# Patient Record
(demographics unavailable — no encounter records)

---

## 2024-11-15 NOTE — PHYSICAL EXAM
[Chaperone Present] : A chaperone was present in the examining room during all aspects of the physical examination [[id number]] : A chaperone was present during the pelvic exam. [Appropriately responsive] : appropriately responsive [Alert] : alert [No Acute Distress] : no acute distress [Soft] : soft [Non-distended] : non-distended [No HSM] : No HSM [No Lesions] : no lesions [No Mass] : no mass [Oriented x3] : oriented x3 [Labia Majora] : normal [Labia Minora] : normal [Discharge] : a  ~M vaginal discharge was present [Normal] : normal [Uterine Adnexae] : normal [FreeTextEntry2] : [facility] [FreeTextEntry7] : c/s scar healed well; pt expressed pain on deep palpation of abdomen. mild rebound no guarding.   [Motion Tenderness] : motion tenderness [Tenderness] : tender

## 2024-11-15 NOTE — HISTORY OF PRESENT ILLNESS
[FreeTextEntry1] : 2024. AMERICA AGUIAR 31 year old  female presents for f/u PID s/p ED visit. Was first seen in office by me  with c-o vag d-c and abd pain, had c-s 4 mos ago. no fever or other associated sxs. pain worsened  and sent pt to ER. Pt went to ED on 24, dx'd w/ PID, started on abx - given Ceftriaxone IM and started on Doxy and Flagyl x2 wks CT of A/P done at ED showed - slight hyperemia of uterus, fluid in endocervical canal, appendix is not obstructed, correlate with infection. CT also showed indeterminate 1.4cm R breast mass, recommend mammo/sono- I informed pt and she will have this done   Labs drawn at ED: HCG - negative Liver and kidney functions - wnl White count - normal CBC - normal  BD affirm 24 - positive for BV (currently on Flagyl). Pt reports yellow vaginal discharge still present.  Pt reports abdominal pain/bloating is still present. Has been taking Tylenol/Motrin w/o much improvement. Last night, she noted mid to lower abdominal pain was the worst it has been. Denies back pain, fever, or chills. She has been nauseous, likely 2/2 abx use. Reports normal urination. Normal BMs. She is sexually active in a monogamous relationship.  PMHx: denies GYNHx: denies SHx: wisdom teeth, c/s on 24 @ 40w1d - 7lb 10oz, Gunichol (boy). All: NKDA

## 2024-11-15 NOTE — PLAN
[FreeTextEntry1] : 31 year old female presents with ? PID diagnosis, no appendicitis, no uterine mass, no bowel obstruction, unresponsive to oral abx Severe CMT on pelvic exam today Sending pt to ED for IV antibiotics and expedited consult with ID  CT also showed indeterminate 1.4cm R breast mass: Recommend diagnostic mammo/sono, Rx given.  40 min spent with pt and in discussion with  on speaker phone

## 2024-11-19 NOTE — HISTORY OF PRESENT ILLNESS
[FreeTextEntry1] : AMERICA AGUIAR 31 year old  female presents recently followed for presumed PID treated with ABx then seen in office 1 week ago with worsening sx and transferred to ED due to severe CMT - Pt was seen by ID and abx discontinued.  Pt s/p MRI showing stable L paraovarian cyst with possibility of ruptured ovarian cyst. Pain was improved during hospitalization with Motrin and tylenol.  Pt reports pain worsened when she got home "because I was more on my feet"  Also notes vaginal spotting while on OCPs "I'm not due for my period for another week" reports h/o severe abdominal cramping and heavy bleeding that was improved on OCPs  Denies vaginal discharge or urinary symptoms

## 2024-11-19 NOTE — PLAN
[FreeTextEntry1] : 30 yo P1 postpartum 4 mos with pelvic pain ?endometriosis vs alternate non-GYN pathology vs  (interstitial cystitis?) - irregular bleeding and pain may be due to decreased effectiveness of OCPs from recent abx - continue OCPs -  Motrin 800mg TID - f/u CBC, CMP and UCx  f/u in 2 weeks or prn if worsens

## 2024-11-19 NOTE — PHYSICAL EXAM
[FreeTextEntry7] : soft nondistended +diffuse tenderness worse in suprapubic region. no rebound/guarding [Labia Majora] : normal [Labia Minora] : normal [Normal] : normal [Uterine Adnexae] : normal [FreeTextEntry5] : no CMT. +pain with palpation of anterior vaginal vault

## 2024-12-03 NOTE — PLAN
[FreeTextEntry1] : #BTB on Laura -likely 2/2 irregular OCP use and recent stress (inpatient hospitalization, illness) recommend taking OCP q24h reassess in 3 mo. if BTB does not improve, plan for repeat tvus  #increased exhaustion pt to f/u with PCP to r/o other cuases bleeding light, likely not related to anemia. CBC last week WNL  #BIRAD3 f/u in 6 month with b/l juan/sono  RTO for annual GYN exam MALIA Rose MD

## 2024-12-03 NOTE — HISTORY OF PRESENT ILLNESS
[FreeTextEntry1] : 31 year old  female presents recently followed for presumed PID treated with ABx then seen in office 1 week ago with worsening sx and transferred to ED due to severe CMT - Pt was seen by ID and abx discontinued. Aabdominal pain likely 2/2 MSK pain vs. ruptured ovarian cyst. low suspicion for PID  Pt s/p MRI showing stable L paraovarian cyst with possibility of ruptured ovarian cyst. Pain was improved during hospitalization with Motrin and tylenol.   Pt reports that she has been having BTB for the past 2 weeks. Pt is on Laura for birth control. She states that she takes it nightly at 9pm but can take it within 30 min to 1 hour after it's due  Pain improved but she reports that she feels tired/worn down. Denies heavy VB

## 2024-12-05 NOTE — HISTORY OF PRESENT ILLNESS
[de-identified] : Ms. AMERICA AGUIAR is a 31 year old female who present today for initial consultation. Referred by Dr. Constantino Kay and Dr. Maribel Escamilla. She had a CT of the abdomen for abdominal pain and an incident right breast mass was seen. she subsequently had a mammogram and sonogram which showed alot of benign nodularity but no suspicious masses or calcifications. she has some right breast discomfort but no true masses on self exam. no family hx. of breast cancer  CT abd/pelvis 11/13/24: Nonspecific slight hyperemia of the uterus and fluid within the endocervical canal. There is an indeterminate 1.4 cm breast dense mass. Recommended mammo/sono   B/L mammo/sono 11/27/24: Dense breasts. Multiple bilateral mammographic nodules are seen. Nodules in the posterior central, and central outer aspect middle depth in the right breast likely correspond to a probably benign findings by sonography at 7:00 and 9:00 respectively on sonography, and a nodule in the upper posterior left breast likely corresponds to a probably benign finding by sonography at the 12:00 aspect. Follow-up bilateral diagnostic mammography and targeted bilateral sonography in 6 months are recommended to confirm stability of these findings. The finding at 7:00 on the right likely corresponds to the CT finding. See above discussion. Other benign findings are seen elsewhere bilaterally by mammography and sonography, as described above. BIRADS 3

## 2024-12-05 NOTE — HISTORY OF PRESENT ILLNESS
[de-identified] : Ms. AMERICA AGUIAR is a 31 year old female who present today for initial consultation. Referred by Dr. Constantino Kay and Dr. Maribel Escamilla. She had a CT of the abdomen for abdominal pain and an incident right breast mass was seen. she subsequently had a mammogram and sonogram which showed alot of benign nodularity but no suspicious masses or calcifications. she has some right breast discomfort but no true masses on self exam. no family hx. of breast cancer  CT abd/pelvis 11/13/24: Nonspecific slight hyperemia of the uterus and fluid within the endocervical canal. There is an indeterminate 1.4 cm breast dense mass. Recommended mammo/sono   B/L mammo/sono 11/27/24: Dense breasts. Multiple bilateral mammographic nodules are seen. Nodules in the posterior central, and central outer aspect middle depth in the right breast likely correspond to a probably benign findings by sonography at 7:00 and 9:00 respectively on sonography, and a nodule in the upper posterior left breast likely corresponds to a probably benign finding by sonography at the 12:00 aspect. Follow-up bilateral diagnostic mammography and targeted bilateral sonography in 6 months are recommended to confirm stability of these findings. The finding at 7:00 on the right likely corresponds to the CT finding. See above discussion. Other benign findings are seen elsewhere bilaterally by mammography and sonography, as described above. BIRADS 3

## 2024-12-05 NOTE — PHYSICAL EXAM
[Normal] : supple, no neck mass and thyroid not enlarged [Normal Supraclavicular Lymph Nodes] : normal supraclavicular lymph nodes [Normal Axillary Lymph Nodes] : normal axillary lymph nodes [Normal] : oriented to person, place and time, with appropriate affect [de-identified] : Fibrocytic changes with some discomfort in the lower outer aspect of the right breast but no distinct masses

## 2024-12-05 NOTE — PHYSICAL EXAM
[Normal] : supple, no neck mass and thyroid not enlarged [Normal Supraclavicular Lymph Nodes] : normal supraclavicular lymph nodes [Normal Axillary Lymph Nodes] : normal axillary lymph nodes [Normal] : oriented to person, place and time, with appropriate affect [de-identified] : Fibrocytic changes with some discomfort in the lower outer aspect of the right breast but no distinct masses

## 2024-12-05 NOTE — ASSESSMENT
[FreeTextEntry1] : IMP: 31 year old female with benign nodularity on mammogram and sonogram    PLAN:  b/l mammo/sono 5/2025 and RTO

## 2024-12-05 NOTE — CONSULT LETTER
[Dear  ___] : Dear  [unfilled], [Consult Letter:] : I had the pleasure of evaluating your patient, [unfilled]. [Please see my note below.] : Please see my note below. [Consult Closing:] : Thank you very much for allowing me to participate in the care of this patient.  If you have any questions, please do not hesitate to contact me. [Sincerely,] : Sincerely, [FreeTextEntry1] : I will keep you informed of the follow-up imaging in June, 2025. [FreeTextEntry3] : Maico Talamantes MD FACS Chief of Surgical Oncology  [DrJames  ___] : Dr. OLIVO

## 2025-04-23 NOTE — HISTORY OF PRESENT ILLNESS
[FreeTextEntry1] : 32 yo P1 h/o  and abx treatment for presumed PID 2024 currently on OCPs, Laura, presents c/o monthly menses, but noted to be light x 2  mos. Reports light bleeding x 1 day then spotting for 2nd and 3rd day. concern for the amt of bleeding Pt also reports lower pelvic cramping x 3 week out of the month. Reports menses not too painful. pain a/w constipation and bloating  Pt also reports pain with intercourse since delivery  also reports yellow vaginal discharge. no foul smell or irritation  Pelvic sono today axial UT, with stable subcentimeter intramural fibroid EED 5.05mm bilateral ovaries WNL

## 2025-04-23 NOTE — PLAN
[FreeTextEntry1] : 32 yo female presents with   # light menses - sono reviewed EE 5mm - pt counselled that light or no menses can occur while on OCPs  #vaginal discharge f/u vaginitis swab  #constipation and pelvic pain - unclear if of GYN pathology  - less likely to be related to  given new symptoms - referred to PMD - f/u TSH/FT4 as also reported weakness and fatigue  #dyspareunia- referred to pelvic floor PT   f/u prn and for annual